# Patient Record
Sex: FEMALE | Race: WHITE | NOT HISPANIC OR LATINO | URBAN - METROPOLITAN AREA
[De-identification: names, ages, dates, MRNs, and addresses within clinical notes are randomized per-mention and may not be internally consistent; named-entity substitution may affect disease eponyms.]

---

## 2022-10-17 ENCOUNTER — HOSPITAL ENCOUNTER (OUTPATIENT)
Dept: RADIOLOGY | Facility: MEDICAL CENTER | Age: 47
End: 2022-10-17
Payer: COMMERCIAL

## 2022-10-25 ENCOUNTER — HOSPITAL ENCOUNTER (OUTPATIENT)
Dept: RADIOLOGY | Facility: MEDICAL CENTER | Age: 47
End: 2022-10-25

## 2022-10-25 ENCOUNTER — OFFICE VISIT (OUTPATIENT)
Dept: SURGICAL ONCOLOGY | Facility: MEDICAL CENTER | Age: 47
End: 2022-10-25
Payer: COMMERCIAL

## 2022-10-25 VITALS
WEIGHT: 164 LBS | SYSTOLIC BLOOD PRESSURE: 112 MMHG | OXYGEN SATURATION: 99 % | TEMPERATURE: 97.9 F | HEART RATE: 83 BPM | HEIGHT: 67 IN | DIASTOLIC BLOOD PRESSURE: 72 MMHG | BODY MASS INDEX: 25.74 KG/M2

## 2022-10-25 DIAGNOSIS — R22.31 AXILLARY MASS, RIGHT: ICD-10-CM

## 2022-10-25 PROCEDURE — 99205 OFFICE O/P NEW HI 60 MIN: CPT | Performed by: SURGERY

## 2022-10-25 RX ORDER — CHLORAL HYDRATE 500 MG
1000 CAPSULE ORAL
COMMUNITY

## 2022-10-25 ASSESSMENT — ENCOUNTER SYMPTOMS
TINGLING: 0
DEPRESSION: 0
CLAUDICATION: 0
COUGH: 0
ORTHOPNEA: 0
VOMITING: 0
BACK PAIN: 0
FEVER: 0
CONSTIPATION: 0
FOCAL WEAKNESS: 0
SINUS PAIN: 0
EYE PAIN: 0
CHILLS: 0
TREMORS: 0
DOUBLE VISION: 0
BLURRED VISION: 0
DIZZINESS: 0
EYE DISCHARGE: 0
BRUISES/BLEEDS EASILY: 0
NAUSEA: 0
SENSORY CHANGE: 0
ABDOMINAL PAIN: 0
HEADACHES: 0
DIARRHEA: 0
HALLUCINATIONS: 0
SHORTNESS OF BREATH: 0
HEARTBURN: 0
HEMOPTYSIS: 0
WEIGHT LOSS: 0
PALPITATIONS: 0
MYALGIAS: 0
FALLS: 0
SPUTUM PRODUCTION: 0

## 2022-10-25 ASSESSMENT — LIFESTYLE VARIABLES: SUBSTANCE_ABUSE: 0

## 2022-10-25 NOTE — H&P
Surgical Oncology History and Physical    Date of Evaluation: 10/25/2022    Reason for Referral: Right axillary mass    Referred By: Rebel Lu MD    HPI: Patient is a 47-year-old female with no past medical history who noted a mass in her right axilla approximately 5 months ago for which she presents for surgical evaluation.  She felt some fullness in this area but did not note any significant pain change in the skin over the area or radiating symptoms down her right arm.  She initially underwent an ultrasound of this area and expansile subdermal lesion with heterogeneous avascular internal architecture debris measuring 3 x 2 x 2.9 cm  suspicious for a dermal inclusion cyst.   She subsequently presented to a general surgeon who took her to the operating room to resect this suspected cyst however during his exploration he noted that the mass was intramuscular and was concerned about injury to surrounding structures so he did not dissect further and aborted the case.  Following this a bilateral mammogram was performed which was read as a BI-RADS 1, bilateral breast MRI did not show any suspicious abnormality per the patient's report (we are working on getting this report and these images), and an MRI of the right axilla showed a peripherally enhancing solid-appearing mass which correlated with the prior ultrasound.  This appeared to reside within the medial muscle belly of the short head of the biceps raising concern for possible metastatic lymphadenopathy versus a soft tissue sarcoma.      She presents today to my clinic for further evaluation after the above-stated work-up.  She currently is asymptomatic from this mass and does not notice that it is changed much in size.  She denies any focal symptoms such as pain, skin changes, drainage from the area.  She denies any numbness or tingling in her right arm.  She has not had any musculoskeletal weakness of the right arm nor has she noticed any significant swelling  of the right arm.    She denies any new breast mass of concern nor does she endorse any cutaneous lesions which have been changing or have looked suspicious by either her or her .  Additionally she denies any other swelling or suspicious lymphadenopathy in any of her other sukhi basins.  She denies any fevers or chills, no night sweats, no recent weight loss, no change in appetite, no change in bowel or bladder habits.  She otherwise feels as if she is in her normal state of health and is able to maintain her normal activity levels with her only symptom being some fullness in her right axilla.    Summary of work-up to date:     Ultrasound extremity nonvascular, right ([facility]) 2022: Findings highly suspicious for dermal inclusion cyst of the right axilla measuring 3 x 2 x 2.9 cm.    Surgery (Dr. Lu) 2022: (Based on op report provided within chart)  Operation performed: Axillary exploration  Findings: Deep mass in the axilla underlying the fascia and well fused to surrounding structures.  Therefore the operation was aborted without resection of the mass.    Bilateral mammogram with tomosynthesis ([facility]) 2022: Negative, BI-RADS 1 bilateral    Bilateral breast MRI with and without contrast ([facility]) 2022: No suspicious mass or non-mass enhancement in either breast.      MRI chest with and without ([facility]) 2022: Peripherally enhancing solid appearing mass correlating with ultrasound from 2022, which appears to reside within the medial muscle belly of the short head of the biceps, raising concern for metastatic lymphadenopathy versus soft tissue sarcoma      Body mass index is 25.69 kg/m².    ECO    Past Medical History:        History reviewed. No pertinent past medical history.    Past Surgical History:      History reviewed. No pertinent surgical history.    Current Medications:   Home Medications    Medication Sig Taking? Last Dose Authorizing  Provider   Ashwagandha 125 MG Cap Take  by mouth. Yes  Physician Outpatient   Omega-3 Fatty Acids (FISH OIL) 1000 MG Cap capsule Take 1,000 mg by mouth 3 times a day with meals. Yes  Physician Outpatient            -Anticoagulation: none       Allergies:       No Known Allergies    Family History:        History reviewed. No pertinent family history.    Social History:          Social History     Socioeconomic History    Marital status:      Spouse name: Not on file    Number of children: Not on file    Years of education: Not on file    Highest education level: Not on file   Occupational History    Not on file   Tobacco Use    Smoking status: Never    Smokeless tobacco: Never   Vaping Use    Vaping Use: Never used   Substance and Sexual Activity    Alcohol use: Yes     Alcohol/week: 4.2 oz     Types: 7 Standard drinks or equivalent per week    Drug use: Not on file    Sexual activity: Never   Other Topics Concern    Not on file   Social History Narrative    Not on file     Social Determinants of Health     Financial Resource Strain: Not on file   Food Insecurity: Not on file   Transportation Needs: Not on file   Physical Activity: Not on file   Stress: Not on file   Social Connections: Not on file   Intimate Partner Violence: Not on file   Housing Stability: Not on file       Review of Systems:  Review of Systems - History obtained from the patient    Review of Systems   Constitutional:  Negative for chills, fever, malaise/fatigue and weight loss.   HENT:  Negative for congestion, ear discharge, ear pain, hearing loss, sinus pain and tinnitus.    Eyes:  Negative for blurred vision, double vision, pain and discharge.   Respiratory:  Negative for cough, hemoptysis, sputum production and shortness of breath.    Cardiovascular:  Negative for chest pain, palpitations, orthopnea, claudication and leg swelling.   Gastrointestinal:  Negative for abdominal pain, constipation, diarrhea, heartburn, nausea and vomiting.  "  Genitourinary:  Negative for dysuria, frequency and urgency.   Musculoskeletal:  Negative for back pain, falls, joint pain and myalgias.   Skin:  Negative for itching and rash.   Neurological:  Negative for dizziness, tingling, tremors, sensory change, focal weakness and headaches.   Endo/Heme/Allergies:  Does not bruise/bleed easily.   Psychiatric/Behavioral:  Negative for depression, hallucinations and substance abuse.      All other ROS negative.      Physical Exam:  /72 (BP Location: Left arm, Patient Position: Sitting, BP Cuff Size: Adult)   Pulse 83   Temp 36.6 °C (97.9 °F) (Temporal)   Ht 1.702 m (5' 7\")   Wt 74.4 kg (164 lb)   SpO2 99%   BMI 25.69 kg/m²      -General: Patient is cooperative, appears stated age, in no acute distress, AAOx3        -HEENT:  Head is atraumatic, neck supple, no scleral icterus      -Neck: trachea is midline     -Respiratory:  no increased work of breathing, stable on room air, clear to auscultation bilaterally    -Cardiovascular: normal peripheral perfusion, normal rate, regular, no murmur appreciated    - Breast: bilateral breasts examined in the seated and supine position, no palpable breast abnormality noted, nipple areolar complex appears normal, no nipple inversions, skin changes, nipple discharge.  -Axilla: Left axilla without any palpable abnormality.  Right axilla with palpable mass, moves with contraction of biceps muscle, firm, non-mobile    -Abdomen: soft, non-tender to palpation, no scars appreciated        -: Deferred         -MSK/Extremities: atraumatic, normal range of motion and strength, ambulatory          -Integumentary: warm, dry, no obvious skin lesions or rashes appreciated, no jaundice        -Neurologic: alert, speech clear and coherent, functional cognition intact        -Psychiatric:  cooperative, appropriate mood and affect       Physical Exam         Imaging:   Ultrasound extremity nonvascular, right ([facility]) 5/27/2022: Findings " highly suspicious for dermal inclusion cyst of the right axilla measuring 3 x 2 x 2.9 cm.    Bilateral mammogram with tomosynthesis ([facility]) 7/1/2022: Negative, BI-RADS 1 bilateral    Bilateral breast MRI with and without contrast ([facility]) 9/12/2022: No suspicious mass or non-mass enhancement in either breast.      MRI chest with and without ([facility]) 9/26/2022: Peripherally enhancing solid appearing mass correlating with ultrasound from 5/27/2022, which appears to reside within the medial muscle belly of the short head of the biceps, raising concern for metastatic lymphadenopathy versus soft tissue sarcoma    Pathology: none    Labs:   No recent labs    Assessment and Plan:   Patient is a 47-year-old otherwise healthy female who noted a right axillary mass approximately 5 months ago for which she presents to me for further surgical evaluation.  The mass itself was first noted about 5 months ago, and has not changed in size.  Work-up to this point includes bilateral mammogram and bilateral breast MRI neither of which revealed a breast mass.  Axillary exploration with the intent of resecting the mass which was initially felt to be subcutaneous in nature but upon discovering that it was deeper with possible surrounding structures involved that operation was aborted.    Today in clinic we reviewed her work-up to date.  I spent time in clinic today showing her the images of the mass within her right axilla.  I also went through the possible differential of what this mass may represent and in general described that this may represent a benign growth that may need no further treatment but it could also represent malignancy.  With respect to malignancy this could represent a lymph node or primary mass given its location in the axilla.  I do not see any overt signs of a primary lesion of the skin or within the breast.  I explained it could represent a lymphoma and/or sarcoma or potentially a  metastatic lymph node from a primary tumor at another site.  To date she has undergone a number of different imaging modalities and on my review of the MRI this does appear to be a solid lesion within the right biceps muscle.  It appears to be in the in the intramuscular compartment however I do think this warrants a biopsy for further work-up and diagnosis.    I described the different potential treatment algorithms depending on what the pathology were to show and explained that we would discuss these potential treatments further pending the results of an ultrasound-guided biopsy.  I also explained that once we have a diagnosis we can complete the staging and then determine what may be needed from a treatment standpoint.    Plan:  -Obtain images of breast MRI from [facility]  - referral for US guided biopsy  -Will call patient with biopsy results and to discuss the next steps of treatment of this lesion    The patient and family asked several great questions which were answered to  their satisfaction.  They  are in agreement with the care plan as outlined above.      Noah Cherry MD  Surgical Oncology  October 25, 2022, 9:52 AM      Addendum:    I called to discuss the patient's pathology results with her a couple weeks ago following her biopsy.  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  I explained to her that this is a benign nerve sheath tumor that does not reprimanding Zent represent a malignancy.  I explained that the standard of care would be to monitor this for increasing size or worsening symptoms.  Currently she does not have symptoms down the right extremity secondary to this mass.  If she were to develop symptoms there are options surgically to resect this however we usually will defer surgery unless she is clearly having symptoms as it does lie in very close proximity to the nerve.  I also explained that we can certainly perform an additional MRI in 6 months to evaluate for any changes in this  lesion and if there were to be 9 then she would not need any further surveillance and can just monitor clinically for symptoms.  She understood all of this and does desire an MRI in 6 months which I will order and she will follow-up with me at that time.

## 2022-10-31 ENCOUNTER — PRE-ADMISSION TESTING (OUTPATIENT)
Dept: ADMISSIONS | Facility: MEDICAL CENTER | Age: 47
End: 2022-10-31
Attending: SURGERY
Payer: COMMERCIAL

## 2022-10-31 DIAGNOSIS — Z01.812 PRE-OPERATIVE LABORATORY EXAMINATION: ICD-10-CM

## 2022-10-31 RX ORDER — MULTIVITAMIN
1 TABLET ORAL
COMMUNITY

## 2022-10-31 RX ORDER — LUTEIN 10 MG
10 TABLET ORAL
COMMUNITY

## 2022-10-31 RX ORDER — VITAMIN B COMPLEX
1 TABLET ORAL
COMMUNITY

## 2022-11-01 ENCOUNTER — HOSPITAL ENCOUNTER (OUTPATIENT)
Facility: MEDICAL CENTER | Age: 47
End: 2022-11-01
Attending: SURGERY | Admitting: SURGERY
Payer: COMMERCIAL

## 2022-11-01 ENCOUNTER — APPOINTMENT (OUTPATIENT)
Dept: RADIOLOGY | Facility: MEDICAL CENTER | Age: 47
End: 2022-11-01
Attending: SURGERY
Payer: COMMERCIAL

## 2022-11-01 VITALS
TEMPERATURE: 97 F | HEIGHT: 67 IN | WEIGHT: 142.2 LBS | OXYGEN SATURATION: 98 % | DIASTOLIC BLOOD PRESSURE: 68 MMHG | HEART RATE: 64 BPM | BODY MASS INDEX: 22.32 KG/M2 | SYSTOLIC BLOOD PRESSURE: 130 MMHG | RESPIRATION RATE: 16 BRPM

## 2022-11-01 DIAGNOSIS — R22.31 AXILLARY MASS, RIGHT: ICD-10-CM

## 2022-11-01 LAB
ERYTHROCYTE [DISTWIDTH] IN BLOOD BY AUTOMATED COUNT: 39.8 FL (ref 35.9–50)
HCG SERPL QL: NEGATIVE
HCT VFR BLD AUTO: 42.1 % (ref 37–47)
HGB BLD-MCNC: 14.4 G/DL (ref 12–16)
INR BLD: 0.9
INR PPP: 0.98 (ref 0.87–1.13)
MCH RBC QN AUTO: 31 PG (ref 27–33)
MCHC RBC AUTO-ENTMCNC: 34.2 G/DL (ref 33.6–35)
MCV RBC AUTO: 90.5 FL (ref 81.4–97.8)
PATHOLOGY CONSULT NOTE: NORMAL
PLATELET # BLD AUTO: 261 K/UL (ref 164–446)
PMV BLD AUTO: 9.4 FL (ref 9–12.9)
PROTHROMBIN TIME: 12.9 SEC (ref 12–14.6)
RBC # BLD AUTO: 4.65 M/UL (ref 4.2–5.4)
WBC # BLD AUTO: 8.1 K/UL (ref 4.8–10.8)

## 2022-11-01 PROCEDURE — 88304 TISSUE EXAM BY PATHOLOGIST: CPT

## 2022-11-01 PROCEDURE — 700111 HCHG RX REV CODE 636 W/ 250 OVERRIDE (IP)

## 2022-11-01 PROCEDURE — 21550 BIOPSY OF NECK/CHEST: CPT

## 2022-11-01 PROCEDURE — 88305 TISSUE EXAM BY PATHOLOGIST: CPT

## 2022-11-01 PROCEDURE — 84703 CHORIONIC GONADOTROPIN ASSAY: CPT

## 2022-11-01 PROCEDURE — 160002 HCHG RECOVERY MINUTES (STAT)

## 2022-11-01 PROCEDURE — 160046 HCHG PACU - 1ST 60 MINS PHASE II

## 2022-11-01 PROCEDURE — 160036 HCHG PACU - EA ADDL 30 MINS PHASE I

## 2022-11-01 PROCEDURE — 85027 COMPLETE CBC AUTOMATED: CPT

## 2022-11-01 PROCEDURE — 85610 PROTHROMBIN TIME: CPT

## 2022-11-01 PROCEDURE — 700105 HCHG RX REV CODE 258: Performed by: RADIOLOGY

## 2022-11-01 PROCEDURE — 88341 IMHCHEM/IMCYTCHM EA ADD ANTB: CPT | Mod: 91

## 2022-11-01 PROCEDURE — 160035 HCHG PACU - 1ST 60 MINS PHASE I

## 2022-11-01 PROCEDURE — 88342 IMHCHEM/IMCYTCHM 1ST ANTB: CPT

## 2022-11-01 PROCEDURE — 36415 COLL VENOUS BLD VENIPUNCTURE: CPT

## 2022-11-01 RX ORDER — MIDAZOLAM HYDROCHLORIDE 1 MG/ML
.5-2 INJECTION INTRAMUSCULAR; INTRAVENOUS PRN
Status: DISCONTINUED | OUTPATIENT
Start: 2022-11-01 | End: 2022-11-01 | Stop reason: HOSPADM

## 2022-11-01 RX ORDER — SODIUM CHLORIDE 9 MG/ML
500 INJECTION, SOLUTION INTRAVENOUS
Status: DISCONTINUED | OUTPATIENT
Start: 2022-11-01 | End: 2022-11-01 | Stop reason: HOSPADM

## 2022-11-01 RX ORDER — LIDOCAINE HYDROCHLORIDE 10 MG/ML
INJECTION, SOLUTION EPIDURAL; INFILTRATION; INTRACAUDAL; PERINEURAL
Status: DISCONTINUED
Start: 2022-11-01 | End: 2022-11-01 | Stop reason: HOSPADM

## 2022-11-01 RX ORDER — SODIUM CHLORIDE, SODIUM LACTATE, POTASSIUM CHLORIDE, CALCIUM CHLORIDE 600; 310; 30; 20 MG/100ML; MG/100ML; MG/100ML; MG/100ML
INJECTION, SOLUTION INTRAVENOUS CONTINUOUS
Status: DISCONTINUED | OUTPATIENT
Start: 2022-11-01 | End: 2022-11-01

## 2022-11-01 RX ORDER — MIDAZOLAM HYDROCHLORIDE 1 MG/ML
INJECTION INTRAMUSCULAR; INTRAVENOUS
Status: COMPLETED
Start: 2022-11-01 | End: 2022-11-01

## 2022-11-01 RX ORDER — HYDROMORPHONE HYDROCHLORIDE 1 MG/ML
0.5 INJECTION, SOLUTION INTRAMUSCULAR; INTRAVENOUS; SUBCUTANEOUS
Status: DISCONTINUED | OUTPATIENT
Start: 2022-11-01 | End: 2022-11-01 | Stop reason: HOSPADM

## 2022-11-01 RX ORDER — ONDANSETRON 2 MG/ML
4 INJECTION INTRAMUSCULAR; INTRAVENOUS EVERY 6 HOURS PRN
Status: DISCONTINUED | OUTPATIENT
Start: 2022-11-01 | End: 2022-11-01 | Stop reason: HOSPADM

## 2022-11-01 RX ORDER — OXYCODONE HYDROCHLORIDE 5 MG/1
5 TABLET ORAL
Status: DISCONTINUED | OUTPATIENT
Start: 2022-11-01 | End: 2022-11-01 | Stop reason: HOSPADM

## 2022-11-01 RX ORDER — SODIUM CHLORIDE 9 MG/ML
1000 INJECTION, SOLUTION INTRAVENOUS CONTINUOUS
Status: DISCONTINUED | OUTPATIENT
Start: 2022-11-01 | End: 2022-11-01 | Stop reason: HOSPADM

## 2022-11-01 RX ADMIN — FENTANYL CITRATE 25 MCG: 50 INJECTION, SOLUTION INTRAMUSCULAR; INTRAVENOUS at 14:08

## 2022-11-01 RX ADMIN — SODIUM CHLORIDE 1000 ML: 9 INJECTION, SOLUTION INTRAVENOUS at 11:55

## 2022-11-01 RX ADMIN — MIDAZOLAM 1 MG: 1 INJECTION, SOLUTION INTRAMUSCULAR; INTRAVENOUS at 14:08

## 2022-11-01 RX ADMIN — MIDAZOLAM HYDROCHLORIDE 1 MG: 1 INJECTION INTRAMUSCULAR; INTRAVENOUS at 14:08

## 2022-11-01 NOTE — OR NURSING
.1635: Patient arrived to phase 2, identified with 2 patient identifiers, dressing c/d/I with guaze and tegaderm, denies pain and nausea.  at bedside assisting with patient getting dressed. Patient is steady on her feet.    1650: IV discontinued, patient and  verbalize understanding of discharge instructions. Patient confirms that all of her personal belongings have been returned to bedside. Patient voided

## 2022-11-01 NOTE — DISCHARGE INSTRUCTIONS
HOME CARE INSTRUCTIONS    ACTIVITY: Rest and take it easy for the first 24 hours.  A responsible adult is recommended to remain with you during that time.  It is normal to feel sleepy.  We encourage you to not do anything that requires balance, judgment or coordination.    FOR 24 HOURS DO NOT:  Drive, operate machinery or run household appliances.  Drink beer or alcoholic beverages.  Make important decisions or sign legal documents.    SPECIAL INSTRUCTIONS: Axillary Biopsy, Care After  This sheet gives you information about how to care for yourself after your procedure. Your health care provider may also give you more specific instructions. If you have problems or questions, contact your health care provider.  What can I expect after the procedure?  After the procedure, it is common to have:  Pain and soreness around your incision area.  Trouble moving your arm or shoulder.  A small amount of swelling in your arm.  Numbness on the upper and inside parts of your arm.  Follow these instructions at home:  Medicines  Take over-the-counter and prescription medicines only as told by your health care provider.  If you were prescribed an antibiotic medicine, take it as told by your health care provider. Do not stop taking the antibiotic even if you start to feel better.  Incision care    Follow instructions from your health care provider about how to take care of your incision. Make sure you:  Wash your hands with soap and water before you change your bandage (dressing). If soap and water are not available, use hand .  Change your dressing as told by your health care provider.  Leave stitches (sutures), skin glue, or adhesive strips in place. These skin closures may need to stay in place for 2 weeks or longer. If adhesive strip edges start to loosen and curl up, you may trim the loose edges. Do not remove adhesive strips completely unless your health care provider tells you to do that.  Check your incision area  every day for signs of infection. Check for:  Redness, swelling, or pain.  Fluid or blood.  Warmth.  Pus or a bad smell.  Activity  Do arm and shoulder exercises as told by your health care provider. This may prevent movement problems and stiffness.  Return to your normal activities as told by your health care provider. Ask your health care provider what activities are safe for you. Avoid any activities that cause pain.  Driving  Do not drive for 24 hours if you were given a medicine to help you relax (sedative).  Do not drive or use heavy machinery while taking prescription pain medicine.  General instructions  If a drainage tube was left in your breast, care for it as told by your health care provider. The drain may stay in place for up to 7-10 days.  Wear a compression garment on your arm as told by your health care provider. This may help to prevent blood clots and reduce swelling in your arm.  Do not use any products that contain nicotine or tobacco, such as cigarettes and e-cigarettes. If you need help quitting, ask your health care provider.  Do not take baths, swim, or use a hot tub until your health care provider approves. Ask your health care provider if you may take showers. You may only be allowed to take sponge baths for bathing.  Do not have your blood pressure taken, have blood drawn, or get injections or IVs in the arm on the side where your lymph nodes were removed.  Follow instructions from your health care provider about how often you should be screened for extra fluid around your lymph nodes (lymphedema).  Keep all follow-up visits as told by your health care provider. This is important.  Contact a health care provider if:  Your arm is swollen, tight, and painful.  You have redness, swelling, or pain around your incision.  You have fluid or blood coming from your incision.  Your incision feels warm to the touch.  You have pus or a bad smell coming from your incision.  Get help right away if:  You  have severe pain that does not get better with medicine.  You have a fever or chills.  You are confused.  You have chest pain.  You have shortness of breath.  Summary  After the procedure, it is common to have pain and soreness and trouble moving your arm or shoulder.  A small amount of arm swelling is normal after the procedure. However, you should contact your health care provider if your arm is swollen, tight, and painful.  Wear a compression garment on your arm as told by your health care provider. This may help to prevent blood clots and reduce swelling in your arm.  Do arm and shoulder exercises as directed to help prevent movement problems and stiffness.  If a drainage tube was left in your breast, care for it as told by your health care provider.  This information is not intended to replace advice given to you by your health care provider. Make sure you discuss any questions you have with your health care provider.  Document Released: 02/14/2018 Document Revised: 04/07/2020 Document Reviewed: 02/14/2018  [employer] Patient Education © 2020 [employer]      DIET: To avoid nausea, slowly advance diet as tolerated, avoiding spicy or greasy foods for the first day.  Add more substantial food to your diet according to your physician's instructions.  Babies can be fed formula or breast milk as soon as they are hungry.  INCREASE FLUIDS AND FIBER TO AVOID CONSTIPATION.    SURGICAL DRESSING/BATHING: Do not submerge under water until cleared by your doctor. You may remove dressing in 24 hours and shower after. Do not scrub surgical site, pat dry only, and leave open to air.    MEDICATIONS: Resume taking daily medication.  Take prescribed pain medication with food.  If no medication is prescribed, you may take non-aspirin pain medication if needed.  PAIN MEDICATION CAN BE VERY CONSTIPATING.  Take a stool softener or laxative such as senokot, pericolace, or milk of magnesia if needed.    Last pain medication given at  _______.    A follow-up appointment should be arranged with your doctor in 1-2 weeks; call to schedule.    You should CALL YOUR PHYSICIAN if you develop:  Fever greater than 101 degrees F.  Pain not relieved by medication, or persistent nausea or vomiting.  Excessive bleeding (blood soaking through dressing) or unexpected drainage from the wound.  Extreme redness or swelling around the incision site, drainage of pus or foul smelling drainage.  Inability to urinate or empty your bladder within 8 hours.  Problems with breathing or chest pain.    You should call 911 if you develop problems with breathing or chest pain.  If you are unable to contact your doctor or surgical center, you should go to the nearest emergency room or urgent care center.  Physician's telephone #: [phone]    MILD FLU-LIKE SYMPTOMS ARE NORMAL.  YOU MAY EXPERIENCE GENERALIZED MUSCLE ACHES, THROAT IRRITATION, HEADACHE AND/OR SOME NAUSEA.    If any questions arise, call your doctor.  If your doctor is not available, please feel free to call the Surgical Center at [phone].  The Center is open Monday through Friday from 7AM to 7PM.      A registered nurse may call you a few days after your surgery to see how you are doing after your procedure.    You may also receive a survey in the mail within the next two weeks and we ask that you take a few moments to complete the survey and return it to us.  Our goal is to provide you with very good care and we value your comments.     Depression / Suicide Risk    As you are discharged from this [facility] facility, it is important to learn how to keep safe from harming yourself.    Recognize the warning signs:  Abrupt changes in personality, positive or negative- including increase in energy   Giving away possessions  Change in eating patterns- significant weight changes-  positive or negative  Change in sleeping patterns- unable to sleep or sleeping all the time   Unwillingness or inability to  communicate  Depression  Unusual sadness, discouragement and loneliness  Talk of wanting to die  Neglect of personal appearance   Rebelliousness- reckless behavior  Withdrawal from people/activities they love  Confusion- inability to concentrate     If you or a loved one observes any of these behaviors or has concerns about self-harm, here's what you can do:  Talk about it- your feelings and reasons for harming yourself  Remove any means that you might use to hurt yourself (examples: pills, rope, extension cords, firearm)  Get professional help from the community (Mental Health, Substance Abuse, psychological counseling)  Do not be alone:Call your Safe Contact- someone whom you trust who will be there for you.  Call your local CRISIS HOTLINE 974-2683 or 661-803-8019  Call your local Children's Mobile Crisis Response Team [place] (298) 541-2844 or www.EventB[employer].com  Call the toll free National Suicide Prevention Hotlines   National Suicide Prevention Lifeline 751-799-WSFY (2490)  National Hope Line Network 800-SUICIDE (172-9254)    I acknowledge receipt and understanding of these Home Care instructions.

## 2022-11-01 NOTE — PROGRESS NOTES
Pt presents to IR CT. Pt was consented by MD at bedside, confirmed by this RN and consent at bedside. Pt remains in locked stretcher in supine position. Patient underwent a right axillary mass biopsy by Dr. Cat. Procedure site was marked by MD and verified using imaging guidance. Pt placed on monitor, prepped and draped in a sterile fashion. Vitals were taken every 5 minutes and remained stable during procedure (see doc flow sheet for results). CO2 waveform capnography was monitored and remained WNL throughout procedure.     Report called to Tita CAST. Pt transported by adalberto with RN to PACU 8B. Core Labs X 4 hand delivered to lab. 3 in formalin, 1 in RPMI.

## 2022-11-01 NOTE — OR NURSING
Please see consult note of Medical student Esme Gabriel    Report given to pawan CAST via SBAR reviewed orders and patient history, no questions at this time.  Pt alert and oriented, resp even and nonlabored, in NAD, pt denies pain/nausea at this time. Pt moves all ext, follows commands and verbalized understanding  of poc and discharge/admission. Family notified via text/phone patient is moving to phase II/room. Will con't to monitor until patient has transitioned.

## 2022-11-01 NOTE — OR SURGEON
Immediate Post- Operative Note        Findings: RIGHT upper arm mass      Procedure(s): USG biopsy of same      Estimated Blood Loss: Less than 5 ml        Complications: None            11/1/2022     2:20 PM     Carlos Eduardo Cat M.D.

## 2022-12-13 DIAGNOSIS — D36.12: ICD-10-CM

## 2023-05-08 ENCOUNTER — HOSPITAL ENCOUNTER (OUTPATIENT)
Dept: RADIOLOGY | Facility: MEDICAL CENTER | Age: 48
End: 2023-05-08
Attending: SURGERY
Payer: COMMERCIAL

## 2023-05-08 DIAGNOSIS — D36.12: ICD-10-CM

## 2023-05-08 PROCEDURE — 71552 MRI CHEST W/O & W/DYE: CPT

## 2023-05-08 PROCEDURE — 700117 HCHG RX CONTRAST REV CODE 255: Performed by: SURGERY

## 2023-05-08 PROCEDURE — A9579 GAD-BASE MR CONTRAST NOS,1ML: HCPCS | Performed by: SURGERY

## 2023-05-08 RX ADMIN — GADOTERIDOL 14 ML: 279.3 INJECTION, SOLUTION INTRAVENOUS at 10:17

## 2023-05-23 ENCOUNTER — TELEPHONE (OUTPATIENT)
Dept: SURGICAL ONCOLOGY | Facility: MEDICAL CENTER | Age: 48
End: 2023-05-23
Payer: COMMERCIAL

## 2023-05-23 NOTE — TELEPHONE ENCOUNTER
Pt called in and LVM stating she got her 6 month follow up MRI done and was waiting to hear results.

## 2023-05-25 DIAGNOSIS — R93.89 ABNORMAL IMAGING OF THYROID: ICD-10-CM

## 2023-05-26 ENCOUNTER — TELEPHONE (OUTPATIENT)
Dept: SURGICAL ONCOLOGY | Facility: MEDICAL CENTER | Age: 48
End: 2023-05-26
Payer: COMMERCIAL

## 2023-05-26 NOTE — TELEPHONE ENCOUNTER
Dr. Rodriguez reviewed MRI images. Her mass is stable in size so plan would be to reimage in a year or so if she wants to keep watching this.  She also had a thyroid mass seen on the MRI which I think she should get an ultrasound to further characterize, if it ends up being something that needs surgery then Id send her to [facility].     Patient informed about MRI results.  Ultrasound of neck order faxed to [facility].  Patient informed to follow-up with primary care and if needed our recommendation is Dr. [facility] if surgery recommended.  Patient also informed to call our office in 1 year to repeat imaging.

## 2023-06-05 ENCOUNTER — TELEPHONE (OUTPATIENT)
Dept: SURGICAL ONCOLOGY | Facility: MEDICAL CENTER | Age: 48
End: 2023-06-05
Payer: COMMERCIAL

## 2023-06-05 NOTE — TELEPHONE ENCOUNTER
0831 06/05/23  Pt called in and identity was verified. Pt stated that she would like her imaging order sent to [facility]. Pt was informed that the order would be sent on their behalf. Order was sent to [facility] at [phone].  Kim WALLACE